# Patient Record
Sex: MALE | Race: WHITE | NOT HISPANIC OR LATINO | Employment: FULL TIME | URBAN - METROPOLITAN AREA
[De-identification: names, ages, dates, MRNs, and addresses within clinical notes are randomized per-mention and may not be internally consistent; named-entity substitution may affect disease eponyms.]

---

## 2023-10-14 ENCOUNTER — HOSPITAL ENCOUNTER (OUTPATIENT)
Facility: HOSPITAL | Age: 33
Setting detail: OBSERVATION
Discharge: HOME | End: 2023-10-15
Attending: EMERGENCY MEDICINE | Admitting: INTERNAL MEDICINE
Payer: COMMERCIAL

## 2023-10-14 DIAGNOSIS — Z98.890 AT RISK FOR BLEEDING ASSOCIATED WITH TONSILLECTOMY AND ADENOIDECTOMY: Primary | ICD-10-CM

## 2023-10-14 DIAGNOSIS — Z91.89 AT RISK FOR BLEEDING ASSOCIATED WITH TONSILLECTOMY AND ADENOIDECTOMY: Primary | ICD-10-CM

## 2023-10-14 PROBLEM — R04.0 NASAL BLEEDING: Status: ACTIVE | Noted: 2023-10-14

## 2023-10-14 PROBLEM — F32.5 MAJOR DEPRESSIVE DISORDER WITH SINGLE EPISODE, IN REMISSION (HCC): Status: ACTIVE | Noted: 2023-10-14

## 2023-10-14 LAB
BASOPHILS # BLD AUTO: 0.02 THOUSANDS/ÂΜL (ref 0–0.1)
BASOPHILS NFR BLD AUTO: 0 % (ref 0–1)
EOSINOPHIL # BLD AUTO: 0 THOUSAND/ÂΜL (ref 0–0.61)
EOSINOPHIL NFR BLD AUTO: 0 % (ref 0–6)
ERYTHROCYTE [DISTWIDTH] IN BLOOD BY AUTOMATED COUNT: 13.2 % (ref 11.6–15.1)
HCT VFR BLD AUTO: 40.2 % (ref 36.5–49.3)
HGB BLD-MCNC: 13.1 G/DL (ref 12–17)
IMM GRANULOCYTES # BLD AUTO: 0.04 THOUSAND/UL (ref 0–0.2)
IMM GRANULOCYTES NFR BLD AUTO: 0 % (ref 0–2)
INR PPP: 1.05 (ref 0.84–1.19)
LYMPHOCYTES # BLD AUTO: 1.43 THOUSANDS/ÂΜL (ref 0.6–4.47)
LYMPHOCYTES NFR BLD AUTO: 15 % (ref 14–44)
MCH RBC QN AUTO: 27 PG (ref 26.8–34.3)
MCHC RBC AUTO-ENTMCNC: 32.6 G/DL (ref 31.4–37.4)
MCV RBC AUTO: 83 FL (ref 82–98)
MONOCYTES # BLD AUTO: 0.77 THOUSAND/ÂΜL (ref 0.17–1.22)
MONOCYTES NFR BLD AUTO: 8 % (ref 4–12)
NEUTROPHILS # BLD AUTO: 7.01 THOUSANDS/ÂΜL (ref 1.85–7.62)
NEUTS SEG NFR BLD AUTO: 77 % (ref 43–75)
NRBC BLD AUTO-RTO: 0 /100 WBCS
PLATELET # BLD AUTO: 181 THOUSANDS/UL (ref 149–390)
PMV BLD AUTO: 9.5 FL (ref 8.9–12.7)
PROTHROMBIN TIME: 13.6 SECONDS (ref 11.6–14.5)
RBC # BLD AUTO: 4.85 MILLION/UL (ref 3.88–5.62)
WBC # BLD AUTO: 9.27 THOUSAND/UL (ref 4.31–10.16)

## 2023-10-14 PROCEDURE — 36415 COLL VENOUS BLD VENIPUNCTURE: CPT

## 2023-10-14 PROCEDURE — 85025 COMPLETE CBC W/AUTO DIFF WBC: CPT

## 2023-10-14 PROCEDURE — 85610 PROTHROMBIN TIME: CPT

## 2023-10-14 PROCEDURE — 99221 1ST HOSP IP/OBS SF/LOW 40: CPT | Performed by: OTOLARYNGOLOGY

## 2023-10-14 PROCEDURE — 99223 1ST HOSP IP/OBS HIGH 75: CPT | Performed by: INTERNAL MEDICINE

## 2023-10-14 RX ORDER — ONDANSETRON 2 MG/ML
4 INJECTION INTRAMUSCULAR; INTRAVENOUS EVERY 6 HOURS PRN
Status: DISCONTINUED | OUTPATIENT
Start: 2023-10-14 | End: 2023-10-15 | Stop reason: HOSPADM

## 2023-10-14 RX ORDER — BUPROPION HYDROCHLORIDE 150 MG/1
150 TABLET ORAL DAILY
COMMUNITY

## 2023-10-14 RX ORDER — PREDNISONE 10 MG/1
30 TABLET ORAL DAILY
COMMUNITY
End: 2023-10-17

## 2023-10-14 RX ORDER — ACETAMINOPHEN 160 MG/5ML
640 LIQUID ORAL EVERY 6 HOURS
COMMUNITY
End: 2023-10-16

## 2023-10-14 RX ORDER — OXYCODONE HCL 5 MG/5 ML
5 SOLUTION, ORAL ORAL EVERY 4 HOURS PRN
COMMUNITY
End: 2023-10-15

## 2023-10-14 RX ORDER — ACETAMINOPHEN 160 MG/5ML
650 SUSPENSION ORAL EVERY 6 HOURS PRN
Status: DISCONTINUED | OUTPATIENT
Start: 2023-10-14 | End: 2023-10-15 | Stop reason: HOSPADM

## 2023-10-14 RX ORDER — EMTRICITABINE AND TENOFOVIR ALAFENAMIDE 200; 25 MG/1; MG/1
1 TABLET ORAL DAILY
COMMUNITY

## 2023-10-14 RX ORDER — CELECOXIB 200 MG/1
200 CAPSULE ORAL 2 TIMES DAILY
COMMUNITY
End: 2023-10-16

## 2023-10-14 RX ORDER — ECHINACEA PURPUREA EXTRACT 125 MG
1 TABLET ORAL AS NEEDED
COMMUNITY

## 2023-10-14 RX ORDER — BUPROPION HYDROCHLORIDE 150 MG/1
150 TABLET ORAL DAILY
Status: DISCONTINUED | OUTPATIENT
Start: 2023-10-15 | End: 2023-10-15 | Stop reason: HOSPADM

## 2023-10-14 RX ORDER — MAGNESIUM HYDROXIDE/ALUMINUM HYDROXICE/SIMETHICONE 120; 1200; 1200 MG/30ML; MG/30ML; MG/30ML
30 SUSPENSION ORAL EVERY 6 HOURS PRN
Status: DISCONTINUED | OUTPATIENT
Start: 2023-10-14 | End: 2023-10-15 | Stop reason: HOSPADM

## 2023-10-14 RX ORDER — PETROLATUM,WHITE
1 OINTMENT IN PACKET (GRAM) TOPICAL 2 TIMES DAILY
COMMUNITY

## 2023-10-14 RX ORDER — ECHINACEA PURPUREA EXTRACT 125 MG
1 TABLET ORAL
Status: DISCONTINUED | OUTPATIENT
Start: 2023-10-14 | End: 2023-10-15 | Stop reason: HOSPADM

## 2023-10-14 RX ORDER — DOCUSATE SODIUM 100 MG/1
100 CAPSULE, LIQUID FILLED ORAL 2 TIMES DAILY PRN
Status: DISCONTINUED | OUTPATIENT
Start: 2023-10-14 | End: 2023-10-15 | Stop reason: HOSPADM

## 2023-10-14 RX ORDER — CELECOXIB 200 MG/1
200 CAPSULE ORAL 2 TIMES DAILY
Status: DISCONTINUED | OUTPATIENT
Start: 2023-10-14 | End: 2023-10-15 | Stop reason: HOSPADM

## 2023-10-14 RX ADMIN — CELECOXIB 200 MG: 200 CAPSULE ORAL at 21:24

## 2023-10-14 RX ADMIN — SODIUM CHLORIDE 1000 ML: 0.9 INJECTION, SOLUTION INTRAVENOUS at 20:17

## 2023-10-14 RX ADMIN — Medication 1 SPRAY: at 22:33

## 2023-10-14 RX ADMIN — ACETAMINOPHEN 650 MG: 650 SUSPENSION ORAL at 20:55

## 2023-10-14 NOTE — ED PROVIDER NOTES
History  Chief Complaint   Patient presents with    Post-op Problem     Pt had tonsillectomy Tuesday 10/10, c/o bleeding     HPI  Patient is a 29 y.o. male with history of tonsillectomy / adenoidectomy / septoplasty / turbinectomy at [facility] on 10/10 presenting to the emergency department for coughing up blood. Patient states that at 6pm he leaned forward and began coughing up blood. States that he coughed up roughly 1/3 a cup. He denies having any pain or bleeding currently. Prior to Admission Medications   Prescriptions Last Dose Informant Patient Reported? Taking?   acetaminophen (TYLENOL) 160 mg/5 mL solution   Yes Yes   Sig: Take 640 mg by mouth every 6 (six) hours   al mag oxide-diphenhydramine-lidocaine viscous (MAGIC MOUTHWASH) 1:1:1 suspension   Yes Yes   Sig: Swish and spit 10 mL every 4 (four) hours as needed for mouth pain or discomfort   buPROPion (WELLBUTRIN XL) 150 mg 24 hr tablet   Yes Yes   Sig: Take 150 mg by mouth daily   celecoxib (CeleBREX) 200 mg capsule   Yes Yes   Sig: Take 200 mg by mouth 2 (two) times a day   emtricitabine-tenofovir AF (Descovy) 200-25 MG tablet   Yes Yes   Sig: Take 1 tablet by mouth daily   oxyCODONE (ROXICODONE) 5 mg/5 mL solution Not Taking  Yes No   Sig: Take 5 mg by mouth every 4 (four) hours as needed for moderate pain   Patient not taking: Reported on 10/14/2023   predniSONE 10 mg tablet   Yes Yes   Sig: Take 30 mg by mouth daily   semaglutide, 0.25 or 0.5 mg/dose, (Ozempic, 0.25 or 0.5 MG/DOSE,) 2 mg/1.5 mL injection pen Not Taking  Yes No   Sig: Inject 0.25 mg under the skin every 7 days   Patient not taking: Reported on 10/14/2023   sodium chloride (OCEAN) 0.65 % nasal spray   Yes Yes   Si spray into each nostril as needed for congestion   white petrolatum ointment   Yes Yes   Sig: Apply 1 Application topically 2 (two) times a day      Facility-Administered Medications: None       History reviewed. No pertinent past medical history.     Past Surgical History:   Procedure Laterality Date    APPENDECTOMY      TONSILLECTOMY         History reviewed. No pertinent family history. I have reviewed and agree with the history as documented. E-Cigarette/Vaping     E-Cigarette/Vaping Substances     Social History     Tobacco Use    Smoking status: Never    Smokeless tobacco: Never   Substance Use Topics    Alcohol use: Not Currently    Drug use: Never        Review of Systems   Constitutional:  Negative for fever. HENT:  Negative for congestion. Eyes:  Negative for pain. Respiratory:  Negative for shortness of breath. Cardiovascular:  Negative for chest pain. Gastrointestinal:  Negative for diarrhea and vomiting. Genitourinary:  Negative for dysuria. Musculoskeletal:  Negative for back pain. Skin:  Negative for rash. Neurological:  Negative for dizziness. All other systems reviewed and are negative. Physical Exam  ED Triage Vitals [10/14/23 1832]   Temperature Pulse Respirations Blood Pressure SpO2   98.2 °F (36.8 °C) 62 16 146/82 98 %      Temp Source Heart Rate Source Patient Position - Orthostatic VS BP Location FiO2 (%)   Temporal Monitor Sitting Left arm --      Pain Score       3             Orthostatic Vital Signs  Vitals:    10/14/23 1832 10/14/23 2149 10/14/23 2315 10/15/23 0829   BP: 146/82 151/90 119/73 147/94   Pulse: 62 (!) 52 60 58   Patient Position - Orthostatic VS: Sitting          Physical Exam  Vitals and nursing note reviewed. Constitutional:       General: He is not in acute distress. Appearance: He is not ill-appearing. HENT:      Head: Normocephalic and atraumatic. Nose:      Comments: No active bleeding     Mouth/Throat:      Mouth: Mucous membranes are moist.      Comments: Post surgical changes in posterior pharynx with some erythema surrounding. No active bleeding at this time. Eyes:      Conjunctiva/sclera: Conjunctivae normal.   Cardiovascular:      Rate and Rhythm: Normal rate.    Pulmonary: Effort: Pulmonary effort is normal. No respiratory distress. Abdominal:      General: There is no distension. Musculoskeletal:         General: Normal range of motion. Cervical back: Neck supple. Skin:     General: Skin is warm. Neurological:      General: No focal deficit present. Mental Status: He is alert.    Psychiatric:         Mood and Affect: Mood normal.         ED Medications  Medications   sodium chloride 0.9 % bolus 1,000 mL (0 mL Intravenous Stopped 10/15/23 0832)   predniSONE tablet 30 mg (30 mg Oral Given 10/15/23 0830)       Diagnostic Studies  Results Reviewed       Procedure Component Value Units Date/Time    Magnesium [[id number]]  (Abnormal) Collected: 10/15/23 0537    Lab Status: Final result Specimen: Blood from Arm, Left Updated: 10/15/23 0620     Magnesium 1.8 mg/dL     Basic metabolic panel [[id number]] Collected: 10/15/23 0537    Lab Status: Final result Specimen: Blood from Arm, Left Updated: 10/15/23 5328     Sodium 140 mmol/L      Potassium 3.6 mmol/L      Chloride 107 mmol/L      CO2 27 mmol/L      ANION GAP 6 mmol/L      BUN 12 mg/dL      Creatinine 0.64 mg/dL      Glucose 85 mg/dL      Calcium 9.1 mg/dL      eGFR 129 ml/min/1.73sq m     Narrative:      [facility] guidelines for Chronic Kidney Disease (CKD):     Stage 1 with normal or high GFR (GFR > 90 mL/min/1.73 square meters)    Stage 2 Mild CKD (GFR = 60-89 mL/min/1.73 square meters)    Stage 3A Moderate CKD (GFR = 45-59 mL/min/1.73 square meters)    Stage 3B Moderate CKD (GFR = 30-44 mL/min/1.73 square meters)    Stage 4 Severe CKD (GFR = 15-29 mL/min/1.73 square meters)    Stage 5 End Stage CKD (GFR <15 mL/min/1.73 square meters)  Note: GFR calculation is accurate only with a steady state creatinine    Protime-INR [[id number]]  (Normal) Collected: 10/15/23 0537    Lab Status: Final result Specimen: Blood from Arm, Left Updated: 10/15/23 0610     Protime 13.9 seconds      INR 1.08    APTT [[id number]]  (Normal) Collected: 10/15/23 0537    Lab Status: Final result Specimen: Blood from Arm, Left Updated: 10/15/23 0610     PTT 26 seconds     CBC and differential [[id number]] Collected: 10/15/23 0537    Lab Status: Final result Specimen: Blood from Arm, Left Updated: 10/15/23 0553     WBC 7.86 Thousand/uL      RBC 4.56 Million/uL      Hemoglobin 12.6 g/dL      Hematocrit 38.0 %      MCV 83 fL      MCH 27.6 pg      MCHC 33.2 g/dL      RDW 13.2 %      MPV 9.5 fL      Platelets 106 Thousands/uL      nRBC 0 /100 WBCs      Neutrophils Relative 61 %      Immat GRANS % 0 %      Lymphocytes Relative 28 %      Monocytes Relative 10 %      Eosinophils Relative 1 %      Basophils Relative 0 %      Neutrophils Absolute 4.77 Thousands/µL      Immature Grans Absolute 0.03 Thousand/uL      Lymphocytes Absolute 2.22 Thousands/µL      Monocytes Absolute 0.76 Thousand/µL      Eosinophils Absolute 0.05 Thousand/µL      Basophils Absolute 0.03 Thousands/µL     Protime-INR [[id number]]  (Normal) Collected: 10/14/23 2017    Lab Status: Final result Specimen: Blood from Arm, Left Updated: 10/14/23 2041     Protime 13.6 seconds      INR 1.05    CBC and differential [[id number]]  (Abnormal) Collected: 10/14/23 2017    Lab Status: Final result Specimen: Blood from Arm, Left Updated: 10/14/23 2028     WBC 9.27 Thousand/uL      RBC 4.85 Million/uL      Hemoglobin 13.1 g/dL      Hematocrit 40.2 %      MCV 83 fL      MCH 27.0 pg      MCHC 32.6 g/dL      RDW 13.2 %      MPV 9.5 fL      Platelets 926 Thousands/uL      nRBC 0 /100 WBCs      Neutrophils Relative 77 %      Immat GRANS % 0 %      Lymphocytes Relative 15 %      Monocytes Relative 8 %      Eosinophils Relative 0 %      Basophils Relative 0 %      Neutrophils Absolute 7.01 Thousands/µL      Immature Grans Absolute 0.04 Thousand/uL      Lymphocytes Absolute 1.43 Thousands/µL      Monocytes Absolute 0.77 Thousand/µL      Eosinophils Absolute 0.00 Thousand/µL      Basophils Absolute 0.02 Thousands/µL                    No orders to display         Procedures  Procedures      ED Course  ED Course as of 10/16/23 2139   Sat Oct 14, 2023   2004 Discussed with ENT - request coags, CBC, fluids   2022 ENT recommended admit to medicine for Obs overnight because has clot on inferior pole. Patient is on prednisone taper which he should continue, scheduled tylenol, no motrin, oxycodone as needed. Keep on clear liquid diet overnight. SBIRT 22yo+      Flowsheet Row Most Recent Value   Initial Alcohol Screen: US AUDIT-C     1. How often do you have a drink containing alcohol? 0 Filed at: 10/14/2023 1921   2. How many drinks containing alcohol do you have on a typical day you are drinking? 0 Filed at: 10/14/2023 1921   3a. Male UNDER 65: How often do you have five or more drinks on one occasion? 0 Filed at: 10/14/2023 1921   3b. FEMALE Any Age, or MALE 65+: How often do you have 4 or more drinks on one occassion? 0 Filed at: 10/14/2023 1921   Audit-C Score 0 Filed at: 10/14/2023 1921   EFRAÍN: How many times in the past year have you. .. Used an illegal drug or used a prescription medication for non-medical reasons? Never Filed at: 10/14/2023 1921                  Medical Decision Making  Amount and/or Complexity of Data Reviewed  Labs: ordered. Risk  Decision regarding hospitalization. Patient is a 29 y.o. male with PMH of recent surgery who presents to the ED with bleeding. Vital signs stable. On exam well appearing, no active bleeding. Given patients recent surgery and concern for possible complication, will reach out to ENT for further evaluation and management of the patient. View ED course above for further discussion on patient workup.      Records from [facility] not available however viewed patients discharge papers that he had with him and ordered all medications that he is currently taking including prednisone taper    All labs reviewed and utilized in the medical decision making process  All radiology studies independently viewed by me and interpreted by the radiologist.  I reviewed all testing with the patient. Upon re-evaluation resting comfortably, no bleeding while in the ED. I discussed the case with Dr. Lionel Rogers. We reviewed the HPI, pertinent PMH, ED course and workup. Agreed with plan and will admit the patient to the hospital for further evaluation and management of post-tonsillectomy bleeding. Patient in stable condition at this time. Disposition  Final diagnoses: At risk for bleeding associated with tonsillectomy and adenoidectomy     Time reflects when diagnosis was documented in both MDM as applicable and the Disposition within this note       Time User Action Codes Description Comment    10/14/2023  7:39 PM Elna Hodgkin Add [R18.29,  Z98.890] At risk for bleeding associated with tonsillectomy and adenoidectomy           ED Disposition       ED Disposition   Admit    Condition   Stable    Date/Time   Sat Oct 14, 2023 2042    Comment   Case was discussed with Dr. Lionel Rogers and the patient's admission status was agreed to be Admission Status: observation status to the service of Dr. Lionel Rogers .                Follow-up Information    None         Discharge Medication List as of 10/15/2023  9:05 AM        CONTINUE these medications which have NOT CHANGED    Details   acetaminophen (TYLENOL) 160 mg/5 mL solution Take 640 mg by mouth every 6 (six) hours, Until Mon 10/16/2023, Historical Med      al mag oxide-diphenhydramine-lidocaine viscous (MAGIC MOUTHWASH) 1:1:1 suspension Swish and spit 10 mL every 4 (four) hours as needed for mouth pain or discomfort, Until Fri 10/20/2023 at 2359, Historical Med      buPROPion (WELLBUTRIN XL) 150 mg 24 hr tablet Take 150 mg by mouth daily, Historical Med      celecoxib (CeleBREX) 200 mg capsule Take 200 mg by mouth 2 (two) times a day, Until Mon 10/16/2023, Historical Med emtricitabine-tenofovir AF (Descovy) 200-25 MG tablet Take 1 tablet by mouth daily, Historical Med      predniSONE 10 mg tablet Take 30 mg by mouth daily, Until Tue 10/17/2023, Historical Med      sodium chloride (OCEAN) 0.65 % nasal spray 1 spray into each nostril as needed for congestion, Historical Med      white petrolatum ointment Apply 1 Application topically 2 (two) times a day, Historical Med           STOP taking these medications       oxyCODONE (ROXICODONE) 5 mg/5 mL solution Comments:   Reason for Stopping:         semaglutide, 0.25 or 0.5 mg/dose, (Ozempic, 0.25 or 0.5 MG/DOSE,) 2 mg/1.5 mL injection pen Comments:   Reason for Stopping:             No discharge procedures on file. PDMP Review       None             ED Provider  Attending physically available and evaluated Prudencio Cronin. I managed the patient along with the ED Attending.     Electronically Signed by           Ahsan Hudson DO  10/16/23 4393

## 2023-10-15 VITALS
WEIGHT: 268 LBS | TEMPERATURE: 97.6 F | OXYGEN SATURATION: 98 % | HEART RATE: 58 BPM | SYSTOLIC BLOOD PRESSURE: 147 MMHG | DIASTOLIC BLOOD PRESSURE: 94 MMHG | RESPIRATION RATE: 16 BRPM

## 2023-10-15 PROBLEM — J95.830 POST-TONSILLECTOMY HEMORRHAGE: Status: ACTIVE | Noted: 2023-10-14

## 2023-10-15 LAB
ANION GAP SERPL CALCULATED.3IONS-SCNC: 6 MMOL/L
APTT PPP: 26 SECONDS (ref 23–37)
BASOPHILS # BLD AUTO: 0.03 THOUSANDS/ÂΜL (ref 0–0.1)
BASOPHILS NFR BLD AUTO: 0 % (ref 0–1)
BUN SERPL-MCNC: 12 MG/DL (ref 5–25)
CALCIUM SERPL-MCNC: 9.1 MG/DL (ref 8.4–10.2)
CHLORIDE SERPL-SCNC: 107 MMOL/L (ref 96–108)
CO2 SERPL-SCNC: 27 MMOL/L (ref 21–32)
CREAT SERPL-MCNC: 0.64 MG/DL (ref 0.6–1.3)
EOSINOPHIL # BLD AUTO: 0.05 THOUSAND/ÂΜL (ref 0–0.61)
EOSINOPHIL NFR BLD AUTO: 1 % (ref 0–6)
ERYTHROCYTE [DISTWIDTH] IN BLOOD BY AUTOMATED COUNT: 13.2 % (ref 11.6–15.1)
GFR SERPL CREATININE-BSD FRML MDRD: 129 ML/MIN/1.73SQ M
GLUCOSE SERPL-MCNC: 85 MG/DL (ref 65–140)
HCT VFR BLD AUTO: 38 % (ref 36.5–49.3)
HGB BLD-MCNC: 12.6 G/DL (ref 12–17)
IMM GRANULOCYTES # BLD AUTO: 0.03 THOUSAND/UL (ref 0–0.2)
IMM GRANULOCYTES NFR BLD AUTO: 0 % (ref 0–2)
INR PPP: 1.08 (ref 0.84–1.19)
LYMPHOCYTES # BLD AUTO: 2.22 THOUSANDS/ÂΜL (ref 0.6–4.47)
LYMPHOCYTES NFR BLD AUTO: 28 % (ref 14–44)
MAGNESIUM SERPL-MCNC: 1.8 MG/DL (ref 1.9–2.7)
MCH RBC QN AUTO: 27.6 PG (ref 26.8–34.3)
MCHC RBC AUTO-ENTMCNC: 33.2 G/DL (ref 31.4–37.4)
MCV RBC AUTO: 83 FL (ref 82–98)
MONOCYTES # BLD AUTO: 0.76 THOUSAND/ÂΜL (ref 0.17–1.22)
MONOCYTES NFR BLD AUTO: 10 % (ref 4–12)
NEUTROPHILS # BLD AUTO: 4.77 THOUSANDS/ÂΜL (ref 1.85–7.62)
NEUTS SEG NFR BLD AUTO: 61 % (ref 43–75)
NRBC BLD AUTO-RTO: 0 /100 WBCS
PLATELET # BLD AUTO: 164 THOUSANDS/UL (ref 149–390)
PMV BLD AUTO: 9.5 FL (ref 8.9–12.7)
POTASSIUM SERPL-SCNC: 3.6 MMOL/L (ref 3.5–5.3)
PROTHROMBIN TIME: 13.9 SECONDS (ref 11.6–14.5)
RBC # BLD AUTO: 4.56 MILLION/UL (ref 3.88–5.62)
SODIUM SERPL-SCNC: 140 MMOL/L (ref 135–147)
WBC # BLD AUTO: 7.86 THOUSAND/UL (ref 4.31–10.16)

## 2023-10-15 PROCEDURE — 83735 ASSAY OF MAGNESIUM: CPT | Performed by: INTERNAL MEDICINE

## 2023-10-15 PROCEDURE — 85730 THROMBOPLASTIN TIME PARTIAL: CPT | Performed by: INTERNAL MEDICINE

## 2023-10-15 PROCEDURE — 80048 BASIC METABOLIC PNL TOTAL CA: CPT | Performed by: INTERNAL MEDICINE

## 2023-10-15 PROCEDURE — 99231 SBSQ HOSP IP/OBS SF/LOW 25: CPT | Performed by: OTOLARYNGOLOGY

## 2023-10-15 PROCEDURE — 99238 HOSP IP/OBS DSCHRG MGMT 30/<: CPT | Performed by: PHYSICIAN ASSISTANT

## 2023-10-15 PROCEDURE — 85025 COMPLETE CBC W/AUTO DIFF WBC: CPT | Performed by: INTERNAL MEDICINE

## 2023-10-15 PROCEDURE — 85610 PROTHROMBIN TIME: CPT | Performed by: INTERNAL MEDICINE

## 2023-10-15 RX ADMIN — EMTRICITABINE AND TENOFOVIR ALAFENAMIDE 1 TABLET: 200; 25 TABLET ORAL at 08:31

## 2023-10-15 RX ADMIN — ACETAMINOPHEN 650 MG: 650 SUSPENSION ORAL at 05:30

## 2023-10-15 RX ADMIN — PREDNISONE 30 MG: 20 TABLET ORAL at 08:30

## 2023-10-15 RX ADMIN — CELECOXIB 200 MG: 200 CAPSULE ORAL at 08:31

## 2023-10-15 NOTE — ASSESSMENT & PLAN NOTE
Pt is POD 5 from septoplasty, turbinate reduction and tonsillectomy at [address] after episodes of bleeding from the mouth, now resolved   ENT following,  Continue tylenol scheduled for pain   Hgb stable  Avoid ibuprofen  Stable for discharge today with outpatient follow up with surgeon tomorrow  Continue prednisone 30 mg daily as prescribed by ENT following procedure until completion

## 2023-10-15 NOTE — DISCHARGE SUMMARY
[address] [facility]  Discharge- Sendy Hanson 1990, 29 y.o. male MRN: [id number]  Unit/Bed#: CW2 211-01 Encounter: [id number]  Primary Care Provider: No primary care provider on file. Date and time admitted to hospital: 10/14/2023  7:16 PM      DOS: 10/15/2023  * Post-tonsillectomy hemorrhage  Assessment & Plan  Pt is POD 5 from septoplasty, turbinate reduction and tonsillectomy at [facility]   Presented after episodes of bleeding from the mouth, now resolved   ENT following,  Continue tylenol scheduled for pain   Hgb stable  Avoid ibuprofen  Stable for discharge today with outpatient follow up with surgeon tomorrow  Continue prednisone 30 mg daily as prescribed by ENT following procedure until completion       Medical Problems       Resolved Problems  Date Reviewed: 10/15/2023   None       Discharging Physician / Practitioner: Bisi Valle PA-C  PCP: No primary care provider on file. Admission Date:   Admission Orders (From admission, onward)       Ordered        10/14/23 2049  Place in Observation  Once                          Discharge Date: 10/15/23    Consultations During Hospital Stay:  ENT    Procedures Performed:   None    Significant Findings / Test Results:   Magnesium 1.8    Incidental Findings:   None   N/A    Test Results Pending at Discharge (will require follow up): None     Outpatient Tests Requested:  Per PCP/ENT    Complications:  None    Reason for Admission: tonsillar bleeding following surgery     Hospital Course:   Sendy Hanson is a 29 y.o. male patient with significant past medical history of recent nasal/tonsillar surgery who originally presented to the hospital on 10/14/2023 due to post tonsillectomy hemorrhage. He presented after having episode of bleeding. He was seen by ENT and monitored overnight for any additional episodes. Pt's hgb remained stable and did not have any more bleeding.  He was seen by ENT and cleared for discharge with outpatient follow up with his primary surgeon tomorrow. Pt was discharged in stable condition. For additional information please refer to medical records. Medication changes include: As outlined by ENT    Please see above list of diagnoses and related plan for additional information. Condition at Discharge: stable    Discharge Day Visit / Exam:   Subjective:  Pt reports that he is doing well today. He has no complaints, reports bleeding has stopped. Vitals: Blood Pressure: 147/94 (10/15/23 0829)  Pulse: 58 (10/15/23 0829)  Temperature: 97.6 °F (36.4 °C) (10/15/23 0829)  Temp Source: Temporal (10/14/23 1832)  Respirations: 16 (10/14/23 1832)  Weight - Scale: 122 kg (268 lb) (10/14/23 1832)  SpO2: 98 % (10/15/23 0829)  Exam:   Physical Exam  Vitals reviewed. Constitutional:       General: He is not in acute distress. Comments: Pt is in no acute distress lying in his hospital bed resting comfortably. Nasal dressing in place, c/d/i   HENT:      Head: Normocephalic. Cardiovascular:      Rate and Rhythm: Normal rate and regular rhythm. Pulmonary:      Effort: No respiratory distress. Breath sounds: Normal breath sounds. No wheezing. Musculoskeletal:      Right lower leg: No edema. Left lower leg: No edema. Skin:     General: Skin is warm and dry. Neurological:      Mental Status: He is alert. Psychiatric:         Mood and Affect: Mood normal.          Discussion with Family:  Discussed with patient at bedside. Discharge instructions/Information to patient and family:   See after visit summary for information provided to patient and family. Provisions for Follow-Up Care:  See after visit summary for information related to follow-up care and any pertinent home health orders. Disposition:   Home    Planned Readmission: None     Discharge Statement:  I spent 30 minutes discharging the patient. This time was spent on the day of discharge.  I had direct contact with the patient on the day of discharge. Greater than 50% of the total time was spent examining patient, answering all patient questions, arranging and discussing plan of care with patient as well as directly providing post-discharge instructions. Additional time then spent on discharge activities. Discharge Medications:  See after visit summary for reconciled discharge medications provided to patient and/or family.       **Please Note: This note may have been constructed using a voice recognition system**

## 2023-10-15 NOTE — CONSULTS
OTOLARYNGOLOGY CONSULT    Date of Service: 10/14/2023      ASSESSMENT/PLAN:  Vernon Anderson is a 29 y.o. male who we are consulted on for post tonsillectomy hemorrhage s/p tonsillectomy POD4    - plan for admission for overnight obs   - scheduled tylenol suspension q6h  - oxycodone suspension as needed for pain   - avoid ibuprofen   - clear liquid diet overnight   - IV hydration as needed  - will reeval in AM to ensure clot resolution     Please contact ENT Resident [place] Text Role for any questions or concerns. HPI  32yoM who presented to the ED POD4 from septoplasty, turbinate reduction, tonsillectomy at [facility]. He reports a bleeding episode from mouth around 6pm. Reports that it was about 1/3 cup of blood. He reports that this resolved after about 5 minutes and has not recurred since. No bleeding from the nose. Hgb 13. 1. cogas normal.    CURRENT HOSPITAL MEDICATIONS  Current Facility-Administered Medications   Medication Dose Route Frequency Provider Last Rate Last Admin    acetaminophen (TYLENOL) oral suspension 650 mg  650 mg Oral Q6H PRN Michael Rodriguez MD   650 mg at 10/14/23 2055    aluminum-magnesium hydroxide-simethicone (MAALOX) oral suspension 30 mL  30 mL Oral Q6H PRN Michael Rodriguez MD        [START ON 10/15/2023] buPROPion (WELLBUTRIN XL) 24 hr tablet 150 mg  150 mg Oral Daily Michael Rodriguez MD        celecoxib (CeleBREX) capsule 200 mg  200 mg Oral BID Michael Rodriguez MD   200 mg at 10/14/23 2124    docusate sodium (COLACE) capsule 100 mg  100 mg Oral BID PRN Michael Rodriguez MD        [START ON 10/15/2023] emtricitabine-tenofovir AF (DESCOVY) 200-25 MG 1 tablet  1 tablet Oral Daily Michael Rodriguez MD        [facility]etron [facility]) injection 4 mg  4 mg Intravenous Q6H PRN Michael Rodriguez MD        [START ON 10/15/2023] predniSONE tablet 30 mg  30 mg Oral Once Michael Rodriguez MD        sodium chloride 0.9 % bolus 1,000 mL  1,000 mL Intravenous Once Baljit Nice,  mL/hr at 10/14/23 2017 1,000 mL at 10/14/23 2017     Current Outpatient Medications   Medication Sig Dispense Refill    acetaminophen (TYLENOL) 160 mg/5 mL solution Take 640 mg by mouth every 6 (six) hours      al mag oxide-diphenhydramine-lidocaine viscous (MAGIC MOUTHWASH) 1:1:1 suspension Swish and spit 10 mL every 4 (four) hours as needed for mouth pain or discomfort      buPROPion (WELLBUTRIN XL) 150 mg 24 hr tablet Take 150 mg by mouth daily      celecoxib (CeleBREX) 200 mg capsule Take 200 mg by mouth 2 (two) times a day      emtricitabine-tenofovir AF (Descovy) 200-25 MG tablet Take 1 tablet by mouth daily      oxyCODONE (ROXICODONE) 5 mg/5 mL solution Take 5 mg by mouth every 4 (four) hours as needed for moderate pain      predniSONE 10 mg tablet Take 30 mg by mouth daily      semaglutide, 0.25 or 0.5 mg/dose, (Ozempic, 0.25 or 0.5 MG/DOSE,) 2 mg/1.5 mL injection pen Inject 0.25 mg under the skin every 7 days      sodium chloride (OCEAN) 0.65 % nasal spray 1 spray into each nostril as needed for congestion      white petrolatum ointment Apply 1 Application topically 2 (two) times a day         REVIEW OF SYSTEMS  As above    HISTORIES  PMH:  History reviewed. No pertinent past medical history. PSH:  History reviewed. No pertinent surgical history. SocHx:  Social History     Tobacco Use    Smoking status: Never    Smokeless tobacco: Never   Substance Use Topics    Alcohol use: Not Currently    Drug use: Never       FH:  History reviewed. No pertinent family history.     ALLERGIES:  No Known Allergies    PHYSICAL EXAM  Visit Vitals  /82 (BP Location: Left arm)   Pulse 62   Temp 98.2 °F (36.8 °C) (Temporal)   Resp 16   Wt 122 kg (268 lb)   SpO2 98%   Smoking Status Never       General: NAD, AOx4  Eyes:  EOMI, PERRL  Ears:  External ears normal in appearance   Nose:  External appearance normal, no bleeding from nose, mustache dressing in place   Oral cavity:  No trismus, no mass/lesions, R lower tonsillar pole with small clot and blood tinged mucus. No evidence of bleeding from L tonsillar fossa or clots. No active bleeding from either tonsillar fossa. Neck: Trachea is midline; no thyroid nodules, Salivary glands symmetrical, no masses/abnormality on palpation  Lymph:  No cervical lymphadenopathy  Skin:  No obvious facial lesions  Neuro: Motor and sensory grossly intact. Face symmetrical, no obvious cranial nerve palsies,motor and sensory grossly intact, no focal deficits. Lungs:  Normal work of breathing, symmetrical chest expansion  Vascular: Well perfused    LABORATORY  Reviewed    PROCEDURES  none    RADIOLOGY  none    Patient Active Problem List    Diagnosis Date Noted    Nasal bleeding 10/14/2023    Major depressive disorder with single episode, in remission ([address]) 10/14/2023       Tracie Elizabeth MD  Otolaryngology - Head and Neck Surgery PGY-3  Please contact ENT Resident [place] Text Role for any questions or concerns.

## 2023-10-15 NOTE — ASSESSMENT & PLAN NOTE
Patient is status post NaSal and turbinate procedure from [facility] and is currently postop day 5. Patient this afternoon at approximately 630 he had an episode of 1/3 cup bleeding from the nasal passages. Currently with packing, the bleeding has stopped. No presence of blood in the pharyngeal area. ENT wants to have patient observed for the next 12 to 24 hours. Can continue celecoxib. No other NSAIDs for now. Can have acetaminophen. Also, patient can have clear liquids for now.

## 2023-10-15 NOTE — PROGRESS NOTES
OTOLARYNGOLOGY PROGRESS NOTE    Date of Service: 10/15/2023 8:46 AM    HPI  Patient is a 29 y.o. male s/p T&A, septum, turbs POD 5 with 1 episode of bleeding last night. Clot was visualized in R lower pole. Overnight Events: no acute events overnight and no further bleeding. Eating and drinking well, no desaturations overnight.      Following up with primary surgeon tomorrow for splint removal.     PHYSICAL EXAM:  Vitals:    10/15/23 0829   BP: 147/94   Pulse: 58   Resp:    Temp: 97.6 °F (36.4 °C)   SpO2: 98%        General: No acute distress, AOx4  HEENT: healing tonsillar fossa without evidence of clot bilaterally, nasal dressing in place  Neurology: No focal deficits  Lungs: Breathing easy, unlabored and even on room air  Cardio: RRR, well perfused  Abd: soft, NT, ND      Intake/Output Summary (Last 24 hours) at 10/15/2023 0846  Last data filed at 10/14/2023 2315  Gross per 24 hour   Intake 400 ml   Output --   Net 400 ml         LABORATORY    Recent Labs     10/14/23  2017 10/15/23  0537   WBC 9.27 7.86   HGB 13.1 12.6   HCT 40.2 38.0    164       Recent Labs     10/15/23  0537   K 3.6      BUN 12   MG 1.8*       Invalid input(s): "PTPAT", "PTINR", "APTTMNNM", "APTTPAT"      Patient Active Problem List   Diagnosis    Post-tonsillectomy hemorrhage    Major depressive disorder with single episode, in remission [facility])         ASSESSMENT  Patient is a 29 y.o. male w/ acute and chronic problems as above, who is POD 5 s/p T&A , septum, turbs doing well with no acute events overnight     PLAN  - ok to dc from ENT standpoint  - reviewed postoperative instructions for follow-up with primary surgeon         Lynne Fay, PGY2  Otolaryngology- Head and Neck Surgery  Please contact [facility] Text ENT resident role
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
## 2023-10-15 NOTE — ED ATTENDING ATTESTATION
10/14/2023  ISoumya MD, saw and evaluated the patient. I have discussed the patient with the resident/non-physician practitioner and agree with the resident's/non-physician practitioner's findings, Plan of Care, and MDM as documented in the resident's/non-physician practitioner's note, except where noted. All available labs and Radiology studies were reviewed. I was present for key portions of any procedure(s) performed by the resident/non-physician practitioner and I was immediately available to provide assistance. At this point I agree with the current assessment done in the Emergency Department.   I have conducted an independent evaluation of this patient a history and physical is as follows:  Postop bleeding from tonsillectomy patient is 4 days postop patient had tonsillectomy with adenoid removal [address] in [place]  Patient had an episode of blood and blood clots metallic taste in his mouth thought it was from the left tonsillar area  The bleeding has since resolved  No symptoms of anemia    No active bleeding normal voice no stridor no drooling  Surgical sites no obvious hematoma noted there is an eschar over both tonsillar fossa's there seems to be a small amount of recent bleeding on the right tonsillar fossa      ENT contacted  The patient will be admitted for observation ENT consultation  ED Course         Critical Care Time  Procedures